# Patient Record
Sex: MALE | Race: BLACK OR AFRICAN AMERICAN | NOT HISPANIC OR LATINO
[De-identification: names, ages, dates, MRNs, and addresses within clinical notes are randomized per-mention and may not be internally consistent; named-entity substitution may affect disease eponyms.]

---

## 2022-09-14 PROBLEM — Z00.00 ENCOUNTER FOR PREVENTIVE HEALTH EXAMINATION: Status: ACTIVE | Noted: 2022-09-14

## 2022-09-16 ENCOUNTER — APPOINTMENT (OUTPATIENT)
Dept: PAIN MANAGEMENT | Facility: CLINIC | Age: 57
End: 2022-09-16

## 2022-09-16 ENCOUNTER — NON-APPOINTMENT (OUTPATIENT)
Age: 57
End: 2022-09-16

## 2022-09-16 VITALS
SYSTOLIC BLOOD PRESSURE: 123 MMHG | WEIGHT: 240 LBS | BODY MASS INDEX: 28.34 KG/M2 | HEIGHT: 77 IN | DIASTOLIC BLOOD PRESSURE: 78 MMHG | TEMPERATURE: 98 F

## 2022-09-16 PROCEDURE — 99204 OFFICE O/P NEW MOD 45 MIN: CPT

## 2022-09-16 NOTE — CONSULT LETTER
[Dear  ___] : Dear  [unfilled], [Consult Letter:] : I had the pleasure of evaluating your patient, [unfilled]. [Please see my note below.] : Please see my note below. [Consult Closing:] : Thank you very much for allowing me to participate in the care of this patient.  If you have any questions, please do not hesitate to contact me. [Sincerely,] : Sincerely, [FreeTextEntry3] : \par Purnima Ramírez DO, MBA\par Director, Pain Management Center\par  of Anesthesiology\par [facility] [employer] [facility]\par \par \par

## 2022-09-16 NOTE — HISTORY OF PRESENT ILLNESS
[Back Pain] : back pain [___ mths] : [unfilled] month(s) ago [Constant] : constant [7] : a minimum pain level of 7/10 [8] : a maximum pain level of 8/10 [Sharp] : sharp [Aching] : aching [Shooting] : shooting [Electric] : electric [Bending] : bending [Lifting] : lifting [Medications] : medications [Heat] : heat [FreeTextEntry1] : HPI\par \par Mr. TAQUERIA MOE is a 57 year M otherwise healthy, presents with left shoulder pain worse after fall.  Complains of bilateral back pain, axial, worse with transition.   Pain is so bad that patient finds it difficult to perform adls and ambulating and working. denies any worsening numbness, weakness, bowel/bladder dysfunction. \par \par \par Previous and current pain medications/doses/effects:\par \par Tylenol\par Naproxen\par \par Previous Pain Treatments:\par \par PT without improvement\par \par Previous Pain Injections:\par \par na\par \par Previous Diagnostic Studies/Images:\par \par na\par \par  [FreeTextEntry2] : 21 [FreeTextEntry7] : Left shoulder pain and back pain

## 2022-09-16 NOTE — ASSESSMENT
[FreeTextEntry1] : >> Imaging and Other Studies\par \par XR L shoulder \par \par back and leg pain likely secondary to lumbar radiculopathy and discogenic pain refractory to conservative treatments including 6 consecutive weeks of home exercises/PT, will obtain MRI LS to evaluate for pathology\par \par may consider PT vs intervention pending eval\par \par >> Therapy and Other Modalities\par \par home exercises\par \par >> Medications\par \par acetaminophen 650mg q8h prn pain (caution <3g daily)\par  \par >> Interventions\par \par  shoulder pain likely secondary to significant joint arthropathy refractory conservative treatments. Will schedule LEFT glenohumeral joint steroid injection r/b/a discussed\par \par \par >> Consults\par \par >> Discussion of Risks/Benefits/Alternatives\par \par 	>Regarding any scheduled procedures:\par \par I have discussed in detail with the patient that any interventional pain procedure is associated with potential risks.  The procedure may include an injection of steroids and potentially other medications (local anesthetic and normal saline) into the epidural space or surrounding tissue of the spine.  There are significant risks of this procedure which include and are not limited to infection, bleeding, worsening pain, dural puncture leading to postdural puncture headache, nerve damage, spinal cord injury, paralysis, stroke, and death.  \par \par There is a chance that the procedure does not improve their pain.  \par \par There are risks associated with the steroid being absorbed into the body systemically.  These include dysphoria, difficulty sleeping, mood swings and personality changes.  Premenopausal women may notice an irregularity in her menstrual cycle for 2-3 months following the injection.  Steroids can specifically affect patients with hypertension, diabetes, and peptic ulcers.  The procedure may cause a temporary increase in blood pressure and blood pressure, and may adversely affect a peptic ulcer.  Other, more rare complications, include avascular necrosis of joints, glaucoma and worsening of osteoporosis. \par \par I have discussed the risks of the procedure at length with the patient, and the potential benefits of pain relief.  I have offered alternatives to the procedure.  All questions were answered.  \par \par The patient expressed understanding and wishes to proceed with the procedure.\par \par 	>Regarding COVID19 Pandemic: \par \par Any planned interventional pain procedure are scheduled because further delay may cause harm or negative outcome to patient.  The goal in performing this procedure is to avoid deterioration of function, emergency room visits (which increases exposure) and reliance on opioids.  \par \par r/b/a discussed with patient, lack of evidence to conclusively determine whether pain management procedures have any positive or negative impact on the possibility of ly the virus and/or development of any sequelae. \par \par Patient counselled regarding timing steroid based intervention 2 weeks before or after COVID-19 vaccine administration to avoid any interaction or affect on efficacy of vaccination\par \par Patient demonstrates understanding\par \par Informed patient that risks associated with the COVID-19 infection.  Informed patient steps taken to limit the risks.  We are implementing safety precautions and following protocols consistent with the CDC and state recommendations. All patients and staff will be checked for fever or signs of illness upon entry to the facility. We will limit our steroid dose to the lowest effective therapeutic dose or in some cases steroids will not be injected at all. \par \par Patient agrees to proceed\par \par >> Conclusion\par \par The above diagnosis and treatment plan is medically reasonable and necessary based on the patient encounter \par There were no barriers to communication.\par Informed patient that I would be available for any additional questions.\par Patient was instructed to call with any worsening symptoms including severe pain, new numbness/weakness, or changes in the bowel/bladder function. \par Discussed role of nsaids in pain management and all relevant risks, if patient is continuing to require after 4 weeks the patient should f/u for alternative treatment. \par Instructed patient to maintain pain diary to monitor pain level, mobility, and function.\par \par The referring provider was informed of the above diagnosis and treatment plan.\par

## 2022-09-16 NOTE — PHYSICAL EXAM
[Normal muscle bulk without asymmetry] : normal muscle bulk without asymmetry [Facet Tenderness] : facet tenderness [Spine: Flexion to ___ degrees, without pain] : spine: flexion to [unfilled] degrees, without pain [Boss] : positive Boss Test [Neer's] : positive Neer's Test [] : Motor: [NL] : normal and symmetric bilaterally [Normal] : Normal affect [de-identified] : Constitutional: Normal, well developed, no acute distress\par Eyes: Symmetric, External structures \par Oropharynx: Lips normal, symmetric, no external lesions appreciated\par Respiratory: Non-labored breathing, no audible wheezes\par Cardiac: Pulse palpated, no tachycardia\par Vascular: No cyanosis appreciated, no edema in bilateral lower extremities\par GI: Nondistended, no jaundice appreciated\par Neurovascular: CN2-12 grossly intact, Alert and oriented\par MSK: Normal muscle bulk, 5/5 Motor strength B/L in LE\par \par

## 2022-09-19 ENCOUNTER — RESULT REVIEW (OUTPATIENT)
Age: 57
End: 2022-09-19

## 2022-09-21 ENCOUNTER — APPOINTMENT (OUTPATIENT)
Dept: PAIN MANAGEMENT | Facility: CLINIC | Age: 57
End: 2022-09-21

## 2022-09-30 ENCOUNTER — APPOINTMENT (OUTPATIENT)
Dept: PAIN MANAGEMENT | Facility: CLINIC | Age: 57
End: 2022-09-30

## 2022-09-30 VITALS
WEIGHT: 240 LBS | DIASTOLIC BLOOD PRESSURE: 78 MMHG | BODY MASS INDEX: 28.34 KG/M2 | TEMPERATURE: 98 F | HEIGHT: 77 IN | SYSTOLIC BLOOD PRESSURE: 119 MMHG

## 2022-09-30 DIAGNOSIS — M25.512 PAIN IN LEFT SHOULDER: ICD-10-CM

## 2022-09-30 PROCEDURE — 20611 DRAIN/INJ JOINT/BURSA W/US: CPT | Mod: LT

## 2022-09-30 PROCEDURE — 99213 OFFICE O/P EST LOW 20 MIN: CPT | Mod: 25

## 2022-09-30 RX ADMIN — BUPIVACAINE HYDROCHLORIDE %: 2.5 INJECTION, SOLUTION INFILTRATION; PERINEURAL at 00:00

## 2022-09-30 RX ADMIN — TRIAMCINOLONE ACETONIDE MG/ML: 10 INJECTION, SUSPENSION INTRA-ARTICULAR; INTRALESIONAL at 00:00

## 2022-09-30 NOTE — HISTORY OF PRESENT ILLNESS
[Back Pain] : back pain [___ mths] : [unfilled] month(s) ago [Constant] : constant [7] : a minimum pain level of 7/10 [8] : a maximum pain level of 8/10 [Sharp] : sharp [Aching] : aching [Shooting] : shooting [Electric] : electric [Bending] : bending [Lifting] : lifting [Medications] : medications [Heat] : heat [FreeTextEntry1] : Interval Note:\par \par Since last visit the pain is not improved.  Continues to have pain over left shoulder.  Affecting adls. Denies any additional weakness, numbness, bowel/bladder dysfunction.  \par \par \par HPI\par \par Mr. TAQUERIA MOE is a 57 year M otherwise healthy, presents with left shoulder pain worse after fall.  Complains of bilateral back pain, axial, worse with transition.   Pain is so bad that patient finds it difficult to perform adls and ambulating and working. denies any worsening numbness, weakness, bowel/bladder dysfunction. \par \par \par Previous and current pain medications/doses/effects:\par \par Tylenol\par Naproxen\par \par Previous Pain Treatments:\par \par PT without improvement\par \par Previous Pain Injections:\par \par na\par \par Previous Diagnostic Studies/Images:\par \par na\par \par  [FreeTextEntry2] : 24 [FreeTextEntry7] : Left shoulder pain and back pain

## 2022-09-30 NOTE — PHYSICAL EXAM
[Normal muscle bulk without asymmetry] : normal muscle bulk without asymmetry [Normal] : Normal affect [Facet Tenderness] : no facet tenderness

## 2022-09-30 NOTE — ASSESSMENT
[FreeTextEntry1] : >> Imaging and Other Studies\par \par I personally reviewed the relevant imaging.  Discussed and explained to patient the likely source of pathology and pain.  Questions answered. XR\par \par back and leg pain likely secondary to lumbar radiculopathy and discogenic pain refractory to conservative treatments including 6 consecutive weeks of home exercises/PT, will obtain MRI LS to evaluate for pathology\par \par may consider PT vs intervention pending eval\par \par >> Therapy and Other Modalities\par \par home exercises\par \par >> Medications\par \par acetaminophen 650mg q8h prn pain (caution <3g daily)\par  \par >> Interventions\par \par  shoulder pain likely secondary to significant joint arthropathy refractory conservative treatments. Will perform LEFT subacromial bursa joint steroid injection r/b/a discussed consented\par \par \par >> Consults\par \par >> Discussion of Risks/Benefits/Alternatives\par \par 	>Regarding any scheduled procedures:\par \par I have discussed in detail with the patient that any interventional pain procedure is associated with potential risks.  The procedure may include an injection of steroids and potentially other medications (local anesthetic and normal saline) into the epidural space or surrounding tissue of the spine.  There are significant risks of this procedure which include and are not limited to infection, bleeding, worsening pain, dural puncture leading to postdural puncture headache, nerve damage, spinal cord injury, paralysis, stroke, and death.  \par \par There is a chance that the procedure does not improve their pain.  \par \par There are risks associated with the steroid being absorbed into the body systemically.  These include dysphoria, difficulty sleeping, mood swings and personality changes.  Premenopausal women may notice an irregularity in her menstrual cycle for 2-3 months following the injection.  Steroids can specifically affect patients with hypertension, diabetes, and peptic ulcers.  The procedure may cause a temporary increase in blood pressure and blood pressure, and may adversely affect a peptic ulcer.  Other, more rare complications, include avascular necrosis of joints, glaucoma and worsening of osteoporosis. \par \par I have discussed the risks of the procedure at length with the patient, and the potential benefits of pain relief.  I have offered alternatives to the procedure.  All questions were answered.  \par \par The patient expressed understanding and wishes to proceed with the procedure.\par \par 	>Regarding COVID19 Pandemic: \par \par Any planned interventional pain procedure are scheduled because further delay may cause harm or negative outcome to patient.  The goal in performing this procedure is to avoid deterioration of function, emergency room visits (which increases exposure) and reliance on opioids.  \par \par r/b/a discussed with patient, lack of evidence to conclusively determine whether pain management procedures have any positive or negative impact on the possibility of ly the virus and/or development of any sequelae. \par \par Patient counselled regarding timing steroid based intervention 2 weeks before or after COVID-19 vaccine administration to avoid any interaction or affect on efficacy of vaccination\par \par Patient demonstrates understanding\par \par Informed patient that risks associated with the COVID-19 infection.  Informed patient steps taken to limit the risks.  We are implementing safety precautions and following protocols consistent with the CDC and state recommendations. All patients and staff will be checked for fever or signs of illness upon entry to the facility. We will limit our steroid dose to the lowest effective therapeutic dose or in some cases steroids will not be injected at all. \par \par Patient agrees to proceed\par \par >> Conclusion\par \par The above diagnosis and treatment plan is medically reasonable and necessary based on the patient encounter \par There were no barriers to communication.\par Informed patient that I would be available for any additional questions.\par Patient was instructed to call with any worsening symptoms including severe pain, new numbness/weakness, or changes in the bowel/bladder function. \par Discussed role of nsaids in pain management and all relevant risks, if patient is continuing to require after 4 weeks the patient should f/u for alternative treatment. \par Instructed patient to maintain pain diary to monitor pain level, mobility, and function.\par \par \par PROCEDURE NOTE\par \par LEFT SUBACROMIAL BURSA INJECTION\par \par The patient was given opportunity to ask questions regarding the procedure, its indications and the associated risks.  The risks of the procedure discussed include but are not limited to infection, bleeding, allergic reactions, nerve injuries, and side effects.  The patient showed understanding and wished to proceed.\par \par After obtaining informed consent, the patient's chart was reviewed, the site of operation was marked, and the patient's identification was confirmed.  The patient was brought to the Exam Room and placed in the seated position on the exam room table with the right  arm behind the back with the elbow bent. Strict sterile technique was used.  Skin was prepped with chloroprep solution.\par \par Using sterile technique, a high-frequency, linear-array ultrasound probe was placed in a transverse position along the superior lateral aspect of the shoulder. The humeral head, overlying supraspinatus tendon, and deltoid muscle were visualized.  The bursa was identified between the supraspinatus tendon and deltoid muscle.  Using a posterior approach, an entry point just posterior to the ultrasound probe was anesthetized using a [30]g needle and [1]cc 1% lidocaine.  Following this, a [25]g needle was inserted using an in-plane technique with the ultrasound such that the needle shaft and tip were visualized.  When the needle tip entered the bursa, a mixture of 20mg kenalog with 5cc 0.25% Bupivacaine was injected. The needle was then flushed with lidocaine and removed.  A band-aid dressing was applied.\par \par The patient tolerated the procedure well.  Vital signs remained stable throughout and there were no immediate adverse events. The patient was asked to follow up in 2 weeks and was instructed to call with fever, chills, increased pain, redness or swelling at the injection site, numbness or weakness.\par

## 2022-12-21 ENCOUNTER — APPOINTMENT (OUTPATIENT)
Dept: PAIN MANAGEMENT | Facility: CLINIC | Age: 57
End: 2022-12-21

## 2023-01-11 ENCOUNTER — APPOINTMENT (OUTPATIENT)
Dept: PAIN MANAGEMENT | Facility: CLINIC | Age: 58
End: 2023-01-11
Payer: COMMERCIAL

## 2023-01-11 VITALS
BODY MASS INDEX: 28.34 KG/M2 | HEIGHT: 77 IN | SYSTOLIC BLOOD PRESSURE: 142 MMHG | TEMPERATURE: 98 F | WEIGHT: 240 LBS | DIASTOLIC BLOOD PRESSURE: 80 MMHG

## 2023-01-11 DIAGNOSIS — M77.8 OTHER ENTHESOPATHIES, NOT ELSEWHERE CLASSIFIED: ICD-10-CM

## 2023-01-11 PROCEDURE — 99214 OFFICE O/P EST MOD 30 MIN: CPT

## 2023-01-11 NOTE — PHYSICAL EXAM
[Normal muscle bulk without asymmetry] : normal muscle bulk without asymmetry [Neer's] : positive Neer's Test [Normal] : Normal affect

## 2023-01-11 NOTE — HISTORY OF PRESENT ILLNESS
[5] : 3. What number best describes how, during the past week, pain has interfered with your general activity? 5/10 pain [Back Pain] : back pain [___ mths] : [unfilled] month(s) ago [Constant] : constant [7] : a minimum pain level of 7/10 [8] : a maximum pain level of 8/10 [Sharp] : sharp [Aching] : aching [Shooting] : shooting [Electric] : electric [Bending] : bending [Lifting] : lifting [Medications] : medications [Heat] : heat [FreeTextEntry1] : Interval Note:\par \par sp LEFT SUBACROMIAL BURSA INJECTION 9/20/22 without significant lasting relief of left shoulder pain\par Since last visit the pain is not improved.  Continues to have pain over left shoulder.  Affecting adls. Difficulty with abduction and external rotation. Denies any additional weakness, numbness, bowel/bladder dysfunction.  \par \par \par HPI\par \par Mr. TAQUERIA MOE is a 57 year M otherwise healthy, presents with left shoulder pain worse after fall.  Complains of bilateral back pain, axial, worse with transition.   Pain is so bad that patient finds it difficult to perform adls and ambulating and working. denies any worsening numbness, weakness, bowel/bladder dysfunction. \par \par \par Previous and current pain medications/doses/effects:\par \par Tylenol\par Naproxen\par \par Previous Pain Treatments:\par \par PT without improvement\par \par Previous Pain Injections:\par \par na\par \par Previous Diagnostic Studies/Images:\par \par XR Shoulder 9/22\par \par Left Shoulder.\par \par  Frontal and Y scapular projections demonstrate normal articulation at the glenohumeral joint. No\par fracture is identified. There is no evidence of destructive bony lesion. No abnormal periarticular\par soft tissue calcifications can be appreciated. The joint space is well maintained. No significant\par hypertrophic spurring is noted.\par \par \par  IMPRESSION: Unremarkable plain film examination of the left shoulder. If clinically indicated,\par further assessment with MRI is recommended.\par \par \par  [FreeTextEntry2] : 15 [FreeTextEntry7] : Left shoulder pain and back pain

## 2023-01-11 NOTE — ASSESSMENT
[FreeTextEntry1] : >> Imaging and Other Studies\par \par I personally reviewed the relevant imaging.  Discussed and explained to patient the likely source of pathology and pain.  Questions answered. XR\par \par back and leg pain likely secondary to lumbar radiculopathy and discogenic pain refractory to conservative treatments including 6 consecutive weeks of home exercises/PT, will obtain MRI LS to evaluate for pathology\par \par MRI L Shoulder to evaluate for pathology - likely soft tissue\par \par may consider PT vs intervention pending eval\par \par >> Therapy and Other Modalities\par \par home exercises\par \par >> Medications\par \par acetaminophen 650mg q8h prn pain (caution <3g daily)\par  \par >> Interventions\par \par  shoulder pain likely secondary to significant joint arthropathy refractory conservative treatments. sp LEFT subacromial bursa joint steroid injection \par \par >> Consults\par \par >> Discussion of Risks/Benefits/Alternatives\par \par 	>Regarding any scheduled procedures:\par \par I have discussed in detail with the patient that any interventional pain procedure is associated with potential risks.  The procedure may include an injection of steroids and potentially other medications (local anesthetic and normal saline) into the epidural space or surrounding tissue of the spine.  There are significant risks of this procedure which include and are not limited to infection, bleeding, worsening pain, dural puncture leading to postdural puncture headache, nerve damage, spinal cord injury, paralysis, stroke, and death.  \par \par There is a chance that the procedure does not improve their pain.  \par \par There are risks associated with the steroid being absorbed into the body systemically.  These include dysphoria, difficulty sleeping, mood swings and personality changes.  Premenopausal women may notice an irregularity in her menstrual cycle for 2-3 months following the injection.  Steroids can specifically affect patients with hypertension, diabetes, and peptic ulcers.  The procedure may cause a temporary increase in blood pressure and blood pressure, and may adversely affect a peptic ulcer.  Other, more rare complications, include avascular necrosis of joints, glaucoma and worsening of osteoporosis. \par \par I have discussed the risks of the procedure at length with the patient, and the potential benefits of pain relief.  I have offered alternatives to the procedure.  All questions were answered.  \par \par The patient expressed understanding and wishes to proceed with the procedure.\par \par 	>Regarding COVID19 Pandemic: \par \par Any planned interventional pain procedure are scheduled because further delay may cause harm or negative outcome to patient.  The goal in performing this procedure is to avoid deterioration of function, emergency room visits (which increases exposure) and reliance on opioids.  \par \par r/b/a discussed with patient, lack of evidence to conclusively determine whether pain management procedures have any positive or negative impact on the possibility of ly the virus and/or development of any sequelae. \par \par Patient counselled regarding timing steroid based intervention 2 weeks before or after COVID-19 vaccine administration to avoid any interaction or affect on efficacy of vaccination\par \par Patient demonstrates understanding\par \par Informed patient that risks associated with the COVID-19 infection.  Informed patient steps taken to limit the risks.  We are implementing safety precautions and following protocols consistent with the CDC and state recommendations. All patients and staff will be checked for fever or signs of illness upon entry to the facility. We will limit our steroid dose to the lowest effective therapeutic dose or in some cases steroids will not be injected at all. \par \par Patient agrees to proceed\par \par >> Conclusion\par \par The above diagnosis and treatment plan is medically reasonable and necessary based on the patient encounter \par There were no barriers to communication.\par Informed patient that I would be available for any additional questions.\par Patient was instructed to call with any worsening symptoms including severe pain, new numbness/weakness, or changes in the bowel/bladder function. \par Discussed role of nsaids in pain management and all relevant risks, if patient is continuing to require after 4 weeks the patient should f/u for alternative treatment. \par Instructed patient to maintain pain diary to monitor pain level, mobility, and function.\par \par \par

## 2023-03-15 ENCOUNTER — APPOINTMENT (OUTPATIENT)
Dept: PAIN MANAGEMENT | Facility: CLINIC | Age: 58
End: 2023-03-15
Payer: COMMERCIAL

## 2023-03-15 VITALS
DIASTOLIC BLOOD PRESSURE: 84 MMHG | BODY MASS INDEX: 28.34 KG/M2 | HEIGHT: 77 IN | WEIGHT: 240 LBS | TEMPERATURE: 98 F | SYSTOLIC BLOOD PRESSURE: 143 MMHG

## 2023-03-15 DIAGNOSIS — M19.019 PRIMARY OSTEOARTHRITIS, UNSPECIFIED SHOULDER: ICD-10-CM

## 2023-03-15 DIAGNOSIS — M67.919 UNSPECIFIED DISORDER OF SYNOVIUM AND TENDON, UNSPECIFIED SHOULDER: ICD-10-CM

## 2023-03-15 PROCEDURE — 99214 OFFICE O/P EST MOD 30 MIN: CPT

## 2023-03-15 NOTE — PHYSICAL EXAM
[Normal] : Well developed, in no acute distress, alert and oriented to person, place and time [Normal muscle bulk without asymmetry] : normal muscle bulk without asymmetry [Boss] : positive Boss Test [Neer's] : positive Neer's Test [Facet Tenderness] : facet tenderness [Spine: Flexion to ___ degrees, without pain] : spine: flexion to [unfilled] degrees, without pain

## 2023-03-15 NOTE — ASSESSMENT
[FreeTextEntry1] : >> Imaging and Other Studies\par \par I personally reviewed the relevant imaging.  Discussed and explained to patient the likely source of pathology and pain.  Questions answered. XR\par \par back and leg pain likely secondary to lumbar radiculopathy and discogenic pain refractory to conservative treatments including 6 consecutive weeks of home exercises/PT, difficulty raising out of bed and performing adls, will obtain MRI LS to evaluate for pathology\par \par MRI L Shoulder to evaluate for pathology - likely soft tissue, Patient participated in PT July-September 2022 for his shoulder pain and disability and continues to have pain. \par Some days he cannot perform adls and work because of this pain.  Radiologist recommended MRI after XR. \par \par may consider PT vs intervention pending eval\par \par >> Therapy and Other Modalities\par \par home exercises\par \par >> Medications\par \par acetaminophen 650mg q8h prn pain (caution <3g daily)\par  \par I advised TAQUERIA that the NSAID should be taken with food.  In addition while taking the prescribed NSAID, no over the counter or other NSAIDs should be used, such as ibuprofen (Motrin or Advil) or naproxen (Aleve) as this can cause stomach upset or other side effects.  If needed for fever or breakthrough pain Tylenol can be used.\par \par trial gabapentin 300mg nightly\par cautioned change in mood.  Encouraged to call with any worsening mood or depression/suicidal ideations\par \par >> Interventions\par \par  shoulder pain likely secondary to significant joint arthropathy refractory conservative treatments. sp LEFT subacromial bursa joint steroid injection \par \par >> Consults\par \par >> Discussion of Risks/Benefits/Alternatives\par \par 	>Regarding any scheduled procedures:\par \par I have discussed in detail with the patient that any interventional pain procedure is associated with potential risks.  The procedure may include an injection of steroids and potentially other medications (local anesthetic and normal saline) into the epidural space or surrounding tissue of the spine.  There are significant risks of this procedure which include and are not limited to infection, bleeding, worsening pain, dural puncture leading to postdural puncture headache, nerve damage, spinal cord injury, paralysis, stroke, and death.  \par \par There is a chance that the procedure does not improve their pain.  \par \par There are risks associated with the steroid being absorbed into the body systemically.  These include dysphoria, difficulty sleeping, mood swings and personality changes.  Premenopausal women may notice an irregularity in her menstrual cycle for 2-3 months following the injection.  Steroids can specifically affect patients with hypertension, diabetes, and peptic ulcers.  The procedure may cause a temporary increase in blood pressure and blood pressure, and may adversely affect a peptic ulcer.  Other, more rare complications, include avascular necrosis of joints, glaucoma and worsening of osteoporosis. \par \par I have discussed the risks of the procedure at length with the patient, and the potential benefits of pain relief.  I have offered alternatives to the procedure.  All questions were answered.  \par \par The patient expressed understanding and wishes to proceed with the procedure.\par \par 	>Regarding COVID19 Pandemic: \par \par Any planned interventional pain procedure are scheduled because further delay may cause harm or negative outcome to patient.  The goal in performing this procedure is to avoid deterioration of function, emergency room visits (which increases exposure) and reliance on opioids.  \par \par r/b/a discussed with patient, lack of evidence to conclusively determine whether pain management procedures have any positive or negative impact on the possibility of ly the virus and/or development of any sequelae. \par \par Patient counselled regarding timing steroid based intervention 2 weeks before or after COVID-19 vaccine administration to avoid any interaction or affect on efficacy of vaccination\par \par Patient demonstrates understanding\par \par Informed patient that risks associated with the COVID-19 infection.  Informed patient steps taken to limit the risks.  We are implementing safety precautions and following protocols consistent with the CDC and state recommendations. All patients and staff will be checked for fever or signs of illness upon entry to the facility. We will limit our steroid dose to the lowest effective therapeutic dose or in some cases steroids will not be injected at all. \par \par Patient agrees to proceed\par \par >> Conclusion\par \par The above diagnosis and treatment plan is medically reasonable and necessary based on the patient encounter \par There were no barriers to communication.\par Informed patient that I would be available for any additional questions.\par Patient was instructed to call with any worsening symptoms including severe pain, new numbness/weakness, or changes in the bowel/bladder function. \par Discussed role of nsaids in pain management and all relevant risks, if patient is continuing to require after 4 weeks the patient should f/u for alternative treatment. \par Instructed patient to maintain pain diary to monitor pain level, mobility, and function.\par \par \par

## 2023-03-15 NOTE — HISTORY OF PRESENT ILLNESS
[6] : 3. What number best describes how, during the past week, pain has interfered with your general activity? 6/10 pain [Back Pain] : back pain [___ mths] : [unfilled] month(s) ago [Constant] : constant [7] : a minimum pain level of 7/10 [8] : a maximum pain level of 8/10 [Sharp] : sharp [Aching] : aching [Shooting] : shooting [Electric] : electric [Bending] : bending [Lifting] : lifting [Medications] : medications [Heat] : heat [FreeTextEntry1] : Interval Note:\par \par sp LEFT SUBACROMIAL BURSA INJECTION 9/20/22 without significant lasting relief of left shoulder pain\par Since last visit the pain is not improved.  Continues to have pain over left shoulder.  Affecting adls. Difficulty with abduction and external rotation. \par \par Patient participated in PT July-September 2022 for his shoulder pain and disability and continues to have pain. \par Some days he cannot perform adls and work because of this pain. \par \par Also continues to complain of back pain, worse with transition, and axial, patient having much trouble performing adls and working because of the pain. \par \par Patient reports that some days he can't even rise from bed. \par \par Denies any additional weakness, numbness, bowel/bladder dysfunction.  \par \par \par HPI\par \par Mr. TAQUERIA MOE is a 57 year M otherwise healthy, presents with left shoulder pain worse after fall.  Complains of bilateral back pain, axial, worse with transition.   Pain is so bad that patient finds it difficult to perform adls and ambulating and working. denies any worsening numbness, weakness, bowel/bladder dysfunction. \par \par \par Previous and current pain medications/doses/effects:\par \par Tylenol\par Naproxen\par \par Previous Pain Treatments:\par \par PT without improvement\par \par Previous Pain Injections:\par \par na\par \par Previous Diagnostic Studies/Images:\par \par XR Shoulder 9/22\par \par Left Shoulder.\par \par  Frontal and Y scapular projections demonstrate normal articulation at the glenohumeral joint. No\par fracture is identified. There is no evidence of destructive bony lesion. No abnormal periarticular\par soft tissue calcifications can be appreciated. The joint space is well maintained. No significant\par hypertrophic spurring is noted.\par \par \par  IMPRESSION: Unremarkable plain film examination of the left shoulder. If clinically indicated,\par further assessment with MRI is recommended.\par \par \par  [FreeTextEntry2] : 18 [FreeTextEntry7] : Left shoulder pain and back pain

## 2023-03-22 ENCOUNTER — APPOINTMENT (OUTPATIENT)
Dept: PAIN MANAGEMENT | Facility: CLINIC | Age: 58
End: 2023-03-22

## 2023-04-12 ENCOUNTER — RX RENEWAL (OUTPATIENT)
Age: 58
End: 2023-04-12

## 2023-04-12 RX ORDER — GABAPENTIN 300 MG/1
300 CAPSULE ORAL
Qty: 30 | Refills: 0 | Status: ACTIVE | COMMUNITY
Start: 2023-03-15 | End: 1900-01-01

## 2023-05-01 ENCOUNTER — RESULT REVIEW (OUTPATIENT)
Age: 58
End: 2023-05-01

## 2023-05-03 ENCOUNTER — APPOINTMENT (OUTPATIENT)
Dept: PAIN MANAGEMENT | Facility: CLINIC | Age: 58
End: 2023-05-03

## 2023-05-10 ENCOUNTER — APPOINTMENT (OUTPATIENT)
Dept: PAIN MANAGEMENT | Facility: CLINIC | Age: 58
End: 2023-05-10
Payer: COMMERCIAL

## 2023-05-10 VITALS
HEIGHT: 77 IN | DIASTOLIC BLOOD PRESSURE: 82 MMHG | BODY MASS INDEX: 28.34 KG/M2 | SYSTOLIC BLOOD PRESSURE: 120 MMHG | WEIGHT: 240 LBS

## 2023-05-10 DIAGNOSIS — G89.4 CHRONIC PAIN SYNDROME: ICD-10-CM

## 2023-05-10 DIAGNOSIS — M47.817 SPONDYLOSIS W/OUT MYELOPATHY OR RADICULOPATHY, LUMBOSACRAL REGION: ICD-10-CM

## 2023-05-10 DIAGNOSIS — M79.18 MYALGIA, OTHER SITE: ICD-10-CM

## 2023-05-10 DIAGNOSIS — M67.819 OTHER SPECIFIED DISORDERS OF SYNOVIUM AND TENDON, UNSPECIFIED SHOULDER: ICD-10-CM

## 2023-05-10 DIAGNOSIS — M51.26 OTHER INTERVERTEBRAL DISC DISPLACEMENT, LUMBAR REGION: ICD-10-CM

## 2023-05-10 PROCEDURE — 99214 OFFICE O/P EST MOD 30 MIN: CPT

## 2023-05-10 RX ORDER — TIZANIDINE 2 MG/1
2 TABLET ORAL
Qty: 45 | Refills: 2 | Status: ACTIVE | COMMUNITY
Start: 2023-05-10 | End: 1900-01-01

## 2023-05-10 NOTE — HISTORY OF PRESENT ILLNESS
[3] : 3. What number best describes how, during the past week, pain has interfered with your general activity? 3/10 pain [Back Pain] : back pain [___ mths] : [unfilled] month(s) ago [Constant] : constant [7] : a minimum pain level of 7/10 [8] : a maximum pain level of 8/10 [Sharp] : sharp [Aching] : aching [Shooting] : shooting [Electric] : electric [Bending] : bending [Lifting] : lifting [Medications] : medications [Heat] : heat [FreeTextEntry1] : Interval Note:\par \par Shoulder continues to have pain with abduction as well as axial back pain worse with transition.  Overall, however patient reports that it is improving. \par \par Denies any additional weakness, numbness, bowel/bladder dysfunction.  \par \par \par HPI\par \par Mr. TAQUERIA MOE is a 58 year M otherwise healthy, presents with left shoulder pain worse after fall.  Complains of bilateral back pain, axial, worse with transition.   Pain is so bad that patient finds it difficult to perform adls and ambulating and working. denies any worsening numbness, weakness, bowel/bladder dysfunction. \par \par \par Previous and current pain medications/doses/effects:\par \par Tylenol\par Naproxen\par \par Previous Pain Treatments:\par \par PT without improvement\par \par Previous Pain Injections:\par \par na\par \par Previous Diagnostic Studies/Images:\par \par MRI L Shoulder 5/23\par \par There is mild supraspinatus and infraspinatus tendinosis without high-grade tear. There is mild\par subscapularis tendinosis with superimposed high-grade tearing limited to the upper most insertional\par fibers.  The muscles of the rotator cuff have no atrophy or fatty infiltration.\par \par  The intra and extraarticular portions of the biceps tendon are unremarkable.\par \par  There is no subacromial/subdeltoid bursitis.  The acromioclavicular joint is is mildly arthritic.\par There is no subacromial enthesophyte.\par \par  There is mild intrasubstance tearing of the posterior labrum.  No glenohumeral articular cartilage\par defects are identified.\par \par  There is no joint effusion.\par \par  There is no acute fracture.\par \par MRI LS 5/23\par \par LOWER THORACIC SPINE: No spinal canal or neuroforaminal stenosis.\par \par  L1-L2: Mild facet arthropathy. No spinal canal or neuroforaminal stenosis.\par  L2-L3: Mild facet arthropathy. No spinal canal or neuroforaminal stenosis.\par  L3-L4: Mild facet arthropathy. No spinal canal or neuroforaminal stenosis.\par  L4-L5: Moderate bilateral facet arthropathy results in mild bilateral neural foraminal narrowing\par but no significant central canal narrowing.\par  L5-S1: Moderate facet arthropathy. No spinal canal or neuroforaminal stenosis.\par \par \par \par  IMPRESSION:\par \par  Mild bilateral neural foraminal narrowing at L4-L5 secondary to moderate facet arthropathy. No\par additional areas of significant central canal or neural foraminal narrowing throughout the lumbar\par spine.\par \par \par XR Shoulder 9/22\par \par Left Shoulder.\par \par  Frontal and Y scapular projections demonstrate normal articulation at the glenohumeral joint. No\par fracture is identified. There is no evidence of destructive bony lesion. No abnormal periarticular\par soft tissue calcifications can be appreciated. The joint space is well maintained. No significant\par hypertrophic spurring is noted.\par \par \par  IMPRESSION: Unremarkable plain film examination of the left shoulder. If clinically indicated,\par further assessment with MRI is recommended.\par \par \par  [FreeTextEntry2] : 9 [FreeTextEntry7] : Left shoulder pain and back pain

## 2023-05-10 NOTE — PHYSICAL EXAM
[Normal] : Well developed, in no acute distress, alert and oriented to person, place and time [Normal muscle bulk without asymmetry] : normal muscle bulk without asymmetry [Facet Tenderness] : facet tenderness

## 2023-05-10 NOTE — ASSESSMENT
[FreeTextEntry1] : >> Imaging and Other Studies\par \par I personally reviewed the relevant imaging.  Discussed and explained to patient the likely source of pathology and pain.  Questions answered. MRI\par \par >> Therapy and Other Modalities\par \par restart PT \par \par >> Medications\par \par acetaminophen 650mg q8h prn pain (caution <3g daily)\par  \par I advised TAQUERIA that the NSAID should be taken with food.  In addition while taking the prescribed NSAID, no over the counter or other NSAIDs should be used, such as ibuprofen (Motrin or Advil) or naproxen (Aleve) as this can cause stomach upset or other side effects.  If needed for fever or breakthrough pain Tylenol can be used.\par \par tizanidine prn spasm/pain\par \par >> Interventions\par \par  shoulder pain likely secondary to significant joint arthropathy refractory conservative treatments. sp LEFT subacromial bursa joint steroid injection \par \par >> Consults\par \par >> Discussion of Risks/Benefits/Alternatives\par \par 	>Regarding any scheduled procedures:\par \par I have discussed in detail with the patient that any interventional pain procedure is associated with potential risks.  The procedure may include an injection of steroids and potentially other medications (local anesthetic and normal saline) into the epidural space or surrounding tissue of the spine.  There are significant risks of this procedure which include and are not limited to infection, bleeding, worsening pain, dural puncture leading to postdural puncture headache, nerve damage, spinal cord injury, paralysis, stroke, and death.  \par \par There is a chance that the procedure does not improve their pain.  \par \par There are risks associated with the steroid being absorbed into the body systemically.  These include dysphoria, difficulty sleeping, mood swings and personality changes.  Premenopausal women may notice an irregularity in her menstrual cycle for 2-3 months following the injection.  Steroids can specifically affect patients with hypertension, diabetes, and peptic ulcers.  The procedure may cause a temporary increase in blood pressure and blood pressure, and may adversely affect a peptic ulcer.  Other, more rare complications, include avascular necrosis of joints, glaucoma and worsening of osteoporosis. \par \par I have discussed the risks of the procedure at length with the patient, and the potential benefits of pain relief.  I have offered alternatives to the procedure.  All questions were answered.  \par \par The patient expressed understanding and wishes to proceed with the procedure.\par \par 	>Regarding COVID19 Pandemic: \par \par Any planned interventional pain procedure are scheduled because further delay may cause harm or negative outcome to patient.  The goal in performing this procedure is to avoid deterioration of function, emergency room visits (which increases exposure) and reliance on opioids.  \par \par r/b/a discussed with patient, lack of evidence to conclusively determine whether pain management procedures have any positive or negative impact on the possibility of ly the virus and/or development of any sequelae. \par \par Patient counselled regarding timing steroid based intervention 2 weeks before or after COVID-19 vaccine administration to avoid any interaction or affect on efficacy of vaccination\par \par Patient demonstrates understanding\par \par Informed patient that risks associated with the COVID-19 infection.  Informed patient steps taken to limit the risks.  We are implementing safety precautions and following protocols consistent with the CDC and state recommendations. All patients and staff will be checked for fever or signs of illness upon entry to the facility. We will limit our steroid dose to the lowest effective therapeutic dose or in some cases steroids will not be injected at all. \par \par Patient agrees to proceed\par \par >> Conclusion\par \par The above diagnosis and treatment plan is medically reasonable and necessary based on the patient encounter \par There were no barriers to communication.\par Informed patient that I would be available for any additional questions.\par Patient was instructed to call with any worsening symptoms including severe pain, new numbness/weakness, or changes in the bowel/bladder function. \par Discussed role of nsaids in pain management and all relevant risks, if patient is continuing to require after 4 weeks the patient should f/u for alternative treatment. \par Instructed patient to maintain pain diary to monitor pain level, mobility, and function.\par \par \par

## 2024-08-27 ENCOUNTER — APPOINTMENT (OUTPATIENT)
Dept: FAMILY MEDICINE | Facility: CLINIC | Age: 59
End: 2024-08-27

## 2024-08-30 DIAGNOSIS — G47.00 INSOMNIA, UNSPECIFIED: ICD-10-CM

## 2024-08-30 RX ORDER — ZOLPIDEM TARTRATE 12.5 MG/1
12.5 TABLET, EXTENDED RELEASE ORAL
Qty: 30 | Refills: 0 | Status: ACTIVE | OUTPATIENT
Start: 2024-08-30

## 2024-12-20 ENCOUNTER — APPOINTMENT (OUTPATIENT)
Dept: FAMILY MEDICINE | Facility: CLINIC | Age: 59
End: 2024-12-20

## 2024-12-20 VITALS — HEIGHT: 77 IN | WEIGHT: 262 LBS | BODY MASS INDEX: 30.94 KG/M2

## 2024-12-20 DIAGNOSIS — G47.00 INSOMNIA, UNSPECIFIED: ICD-10-CM

## 2024-12-20 DIAGNOSIS — E66.9 OBESITY, UNSPECIFIED: ICD-10-CM

## 2024-12-20 DIAGNOSIS — Z82.49 FAMILY HISTORY OF ISCHEMIC HEART DISEASE AND OTHER DISEASES OF THE CIRCULATORY SYSTEM: ICD-10-CM

## 2024-12-20 PROCEDURE — 99204 OFFICE O/P NEW MOD 45 MIN: CPT

## 2024-12-20 PROCEDURE — G2211 COMPLEX E/M VISIT ADD ON: CPT | Mod: NC

## 2024-12-20 RX ORDER — SEMAGLUTIDE 0.5 MG/.5ML
0.5 INJECTION, SOLUTION SUBCUTANEOUS
Qty: 1 | Refills: 0 | Status: ACTIVE | COMMUNITY
Start: 2024-12-20 | End: 1900-01-01

## 2025-01-02 ENCOUNTER — NON-APPOINTMENT (OUTPATIENT)
Age: 60
End: 2025-01-02

## 2025-01-03 ENCOUNTER — APPOINTMENT (OUTPATIENT)
Dept: UROLOGY | Facility: CLINIC | Age: 60
End: 2025-01-03

## 2025-01-21 ENCOUNTER — APPOINTMENT (OUTPATIENT)
Dept: FAMILY MEDICINE | Facility: CLINIC | Age: 60
End: 2025-01-21

## 2025-01-28 ENCOUNTER — APPOINTMENT (OUTPATIENT)
Dept: FAMILY MEDICINE | Facility: CLINIC | Age: 60
End: 2025-01-28

## 2025-03-07 ENCOUNTER — APPOINTMENT (OUTPATIENT)
Dept: FAMILY MEDICINE | Facility: CLINIC | Age: 60
End: 2025-03-07
Payer: COMMERCIAL

## 2025-03-07 VITALS
OXYGEN SATURATION: 97 % | HEIGHT: 77 IN | HEART RATE: 77 BPM | BODY MASS INDEX: 30.94 KG/M2 | SYSTOLIC BLOOD PRESSURE: 144 MMHG | WEIGHT: 262 LBS | DIASTOLIC BLOOD PRESSURE: 74 MMHG

## 2025-03-07 VITALS — SYSTOLIC BLOOD PRESSURE: 124 MMHG | DIASTOLIC BLOOD PRESSURE: 70 MMHG

## 2025-03-07 DIAGNOSIS — Z87.09 PERSONAL HISTORY OF OTHER DISEASES OF THE RESPIRATORY SYSTEM: ICD-10-CM

## 2025-03-07 DIAGNOSIS — R05.9 COUGH, UNSPECIFIED: ICD-10-CM

## 2025-03-07 DIAGNOSIS — F32.A DEPRESSION, UNSPECIFIED: ICD-10-CM

## 2025-03-07 DIAGNOSIS — E55.9 VITAMIN D DEFICIENCY, UNSPECIFIED: ICD-10-CM

## 2025-03-07 DIAGNOSIS — E66.9 OBESITY, UNSPECIFIED: ICD-10-CM

## 2025-03-07 DIAGNOSIS — M47.817 SPONDYLOSIS W/OUT MYELOPATHY OR RADICULOPATHY, LUMBOSACRAL REGION: ICD-10-CM

## 2025-03-07 PROCEDURE — G2211 COMPLEX E/M VISIT ADD ON: CPT | Mod: NC

## 2025-03-07 PROCEDURE — 99215 OFFICE O/P EST HI 40 MIN: CPT

## 2025-03-14 DIAGNOSIS — H26.9 UNSPECIFIED CATARACT: ICD-10-CM

## 2025-03-14 LAB — 25(OH)D3 SERPL-MCNC: 16.6 NG/ML

## 2025-06-09 ENCOUNTER — APPOINTMENT (OUTPATIENT)
Dept: FAMILY MEDICINE | Facility: CLINIC | Age: 60
End: 2025-06-09

## 2025-07-11 RX ORDER — IBUPROFEN 600 MG/1
600 TABLET, FILM COATED ORAL 3 TIMES DAILY
Qty: 90 | Refills: 1 | Status: ACTIVE | COMMUNITY
Start: 2025-07-11 | End: 1900-01-01

## 2025-08-12 ENCOUNTER — APPOINTMENT (OUTPATIENT)
Dept: FAMILY MEDICINE | Facility: CLINIC | Age: 60
End: 2025-08-12